# Patient Record
Sex: MALE | Race: WHITE | NOT HISPANIC OR LATINO | Employment: PART TIME | URBAN - METROPOLITAN AREA
[De-identification: names, ages, dates, MRNs, and addresses within clinical notes are randomized per-mention and may not be internally consistent; named-entity substitution may affect disease eponyms.]

---

## 2021-05-31 ENCOUNTER — RECORDS - HEALTHEAST (OUTPATIENT)
Dept: ADMINISTRATIVE | Facility: CLINIC | Age: 36
End: 2021-05-31

## 2022-02-18 ENCOUNTER — APPOINTMENT (OUTPATIENT)
Dept: RADIOLOGY | Facility: CLINIC | Age: 37
End: 2022-02-18
Attending: EMERGENCY MEDICINE

## 2022-02-18 ENCOUNTER — HOSPITAL ENCOUNTER (EMERGENCY)
Facility: CLINIC | Age: 37
Discharge: HOME | End: 2022-02-18
Attending: EMERGENCY MEDICINE | Admitting: EMERGENCY MEDICINE

## 2022-02-18 VITALS
SYSTOLIC BLOOD PRESSURE: 159 MMHG | BODY MASS INDEX: 27.09 KG/M2 | RESPIRATION RATE: 18 BRPM | OXYGEN SATURATION: 99 % | DIASTOLIC BLOOD PRESSURE: 96 MMHG | HEIGHT: 72 IN | HEART RATE: 89 BPM | TEMPERATURE: 97.5 F | WEIGHT: 200 LBS

## 2022-02-18 DIAGNOSIS — M25.572 ACUTE LEFT ANKLE PAIN: ICD-10-CM

## 2022-02-18 DIAGNOSIS — S93.602A FOOT SPRAIN, LEFT, INITIAL ENCOUNTER: ICD-10-CM

## 2022-02-18 PROCEDURE — 99284 EMERGENCY DEPT VISIT MOD MDM: CPT

## 2022-02-18 PROCEDURE — 73630 X-RAY EXAM OF FOOT: CPT | Mod: LT

## 2022-02-18 PROCEDURE — 73610 X-RAY EXAM OF ANKLE: CPT | Mod: LT

## 2022-02-18 ASSESSMENT — ENCOUNTER SYMPTOMS: ARTHRALGIAS: 1

## 2022-02-19 NOTE — ED PROVIDER NOTES
Emergency Department Encounter      NAME: Arcadio Adams  AGE: 36 year old male  YOB: 1985  MRN: [id number]  EVALUATION DATE & TIME: 2/18/2022 10:15 PM    PCP: No primary care provider on file.    ED PROVIDER: Gary Mitchell M.D.      Chief Complaint   Patient presents with     Foot Pain         FINAL IMPRESSION:  1. Foot sprain, left, initial encounter    2. Acute left ankle pain        MEDICAL DECISION MAKING:    10:34 PM I met with the patient, obtained history, performed an initial exam, and discussed options and plan for diagnostics and treatment here in the ED.   11:16 PM Rechecked and updated the patient on imaging results. We discussed the plan for discharge and the patient is agreeable. Reviewed supportive cares, symptomatic treatment, outpatient follow up, and reasons to return to the Emergency Department. Patient to be discharged by ED RN.     This patient is a 36-year-old male with a history of chronic pain and polysubstance abuse who presents with left ankle pain.  He says that he slipped and fell down a flight of stairs a week ago causing an inversion injury to his left ankle.  Since then he has been unable to bear weight on his left foot and ankle.  He says that he has pain over the forefoot where there is some swelling but no significant bruising or signs of cellulitis.  He additionally had some mild tenderness over his ankle which appeared otherwise normal.  X-rays are done of the ankle and foot which did not show any fracture or dislocation.  The patient was discharged home and encouraged to follow-up with his doctor or orthopedics if his symptoms continued.    Pertinent Labs & Imaging studies reviewed. (See chart for details)           The importance of close follow up was discussed. We reviewed warning signs and symptoms, and I instructed Mr. Adams to return to the emergency department immediately if he develops any new or worsening symptoms. I provided additional verbal  discharge instructions. Mr. Adams expressed understanding and agreement with this plan of care, his questions were answered, and he was discharged in stable condition.       MEDICATIONS GIVEN IN THE EMERGENCY:  Medications - No data to display    NEW PRESCRIPTIONS STARTED AT TODAY'S ER VISIT:  There are no discharge medications for this patient.         =================================================================    HPI    Patient information was obtained from: Patient    Use of : N/A       Arcadio Adams is a 36 year old male with a past medical history of chronic pain syndrome and polysubstance abuse, who presents to the ED for evaluation of left ankle pain.    The patient reports slipping and falling down a flight of stairs 1 week ago and twisting his left ankle. He had been unable to place weight on his left ankle until today, but repots severe pain when placing weight on his left foot. He has not had any imaging studies of his foot. He had noticed some purple discoloration to his toes, but today the discoloration was resolved. He has been elevating and icing his foot without much relief. He denies any pain to his left knee.    REVIEW OF SYSTEMS   Review of Systems   Constitutional: Negative for chills and fever.   HENT: Negative for congestion, ear pain, sneezing and sore throat.    Respiratory: Negative for cough and shortness of breath.    Cardiovascular: Negative for chest pain.   Gastrointestinal: Negative for diarrhea, nausea and vomiting.   Musculoskeletal: Positive for arthralgias (left ankle and foot).   All other systems reviewed and are negative.       PAST MEDICAL HISTORY:  History reviewed. No pertinent past medical history.    PAST SURGICAL HISTORY:  History reviewed. No pertinent surgical history.    CURRENT MEDICATIONS:    No current facility-administered medications for this encounter.  No current outpatient medications on file.    ALLERGIES:  Allergies   Allergen Reactions      Varenicline Other (See Comments)     suicidal       FAMILY HISTORY:  History reviewed. No pertinent family history.    SOCIAL HISTORY:   Social History     Socioeconomic History     Marital status:      Spouse name: None     Number of children: None     Years of education: None     Highest education level: None   Occupational History     None   Tobacco Use     Smoking status: None     Smokeless tobacco: None   Substance and Sexual Activity     Alcohol use: None     Drug use: None     Sexual activity: None   Other Topics Concern     None   Social History Narrative     None     Social Determinants of Health     Financial Resource Strain: Not on file   Food Insecurity: Not on file   Transportation Needs: Not on file   Physical Activity: Not on file   Stress: Not on file   Social Connections: Not on file   Intimate Partner Violence: Not on file   Housing Stability: Not on file       PHYSICAL EXAM:    Vitals: BP (!) 159/96   Pulse 89   Temp 97.5  F (36.4  C) (Oral)   Resp 18   Ht 1.829 m (6')   Wt 90.7 kg (200 lb)   SpO2 99%   BMI 27.12 kg/m     Constitutional: Well developed, well nourished. Comfortable appearing.  HENT: Normocephalic, atraumatic, mucous membranes moist, nose normal.   Neck- Supple, gross ROM intact.   Eyes: Pupils mid-range, sclera white, no discharge  Respiratory: Clear to auscultation bilaterally, no respiratory distress, no wheezing, speaks full sentences easily.  Cardiovascular: Normal heart rate, regular rhythm, no murmurs. No lower extremity edema, 2+ DP pulses.   GI: Soft, no tenderness to deep palpation in all quadrants, no masses.  Musculoskeletal: Left foot skin intact. Left Achilles tendon intact. No crepitus or deformity to the left foot or ankle. Left knee and proximal tibia and fibula nontender. Mild tenderness over the medial and lateral left ankle. Left forefoot is swollen and pink with tenderness over the distal 1st, 2nd, 3rd, and 4th metatarsals. Toes neurovascularly  intact. All other extremities normal, moving spontaneously without tenderness.  Skin: Warm, dry, no rash.  Neurologic: Alert & oriented x 3, speech clear, moving all extremities spontaneously   Psychiatric: Affect normal, cooperative.       RADIOLOGY:  XR Foot Left 3 Views   Final Result   IMPRESSION: Moderate dorsal soft tissue swelling, mid and forefoot. No fracture. Normal alignment.      XR Ankle Left 3 Views   Final Result   IMPRESSION: Normal joint spaces and alignment. No fracture.            I, Adolph Keron, am serving as a scribe to document services personally performed by Dr. Gary Mitchell based on my observation and the provider's statements to me. I, Gary Mitchell M.D. attest that Adolph Cabrales is acting in a scribe capacity, has observed my performance of the services and has documented them in accordance with my direction.      Gary Mitchell M.D.  Emergency Medicine  [facility] EMERGENCY ROOM  [address] [id number] [phone]  Dept: [phone]     Gary Mitchell MD  02/25/22 0137

## 2022-02-19 NOTE — ED TRIAGE NOTES
Pt fell down the stairs about a week ago and felt like he twisted his ankle at that time. Pt believes that he has broken his foot/ankle and reports noticing his toes were purple yesterday. L foot is swollen but CMS is intact, toes are pink currently

## 2022-02-24 ASSESSMENT — ENCOUNTER SYMPTOMS
FEVER: 0
CHILLS: 0
COUGH: 0
SHORTNESS OF BREATH: 0
VOMITING: 0
SORE THROAT: 0
NAUSEA: 0
DIARRHEA: 0

## 2025-07-22 ENCOUNTER — HOSPITAL ENCOUNTER (EMERGENCY)
Facility: CLINIC | Age: 40
Discharge: HOME | End: 2025-07-22
Attending: EMERGENCY MEDICINE

## 2025-07-22 VITALS
DIASTOLIC BLOOD PRESSURE: 84 MMHG | TEMPERATURE: 98.3 F | HEIGHT: 72 IN | HEART RATE: 85 BPM | OXYGEN SATURATION: 94 % | SYSTOLIC BLOOD PRESSURE: 143 MMHG | BODY MASS INDEX: 27.12 KG/M2 | RESPIRATION RATE: 18 BRPM

## 2025-07-22 DIAGNOSIS — T50.901A ACCIDENTAL DRUG INGESTION, INITIAL ENCOUNTER: ICD-10-CM

## 2025-07-22 LAB
ATRIAL RATE - MUSE: 73 BPM
DIASTOLIC BLOOD PRESSURE - MUSE: NORMAL MMHG
HOLD SPECIMEN: NORMAL
INTERPRETATION ECG - MUSE: NORMAL
P AXIS - MUSE: 8 DEGREES
PR INTERVAL - MUSE: 152 MS
QRS DURATION - MUSE: 88 MS
QT - MUSE: 380 MS
QTC - MUSE: 418 MS
R AXIS - MUSE: 61 DEGREES
SYSTOLIC BLOOD PRESSURE - MUSE: NORMAL MMHG
T AXIS - MUSE: 41 DEGREES
VENTRICULAR RATE- MUSE: 73 BPM

## 2025-07-22 PROCEDURE — 99285 EMERGENCY DEPT VISIT HI MDM: CPT | Performed by: EMERGENCY MEDICINE

## 2025-07-22 PROCEDURE — 93005 ELECTROCARDIOGRAM TRACING: CPT

## 2025-07-22 ASSESSMENT — COLUMBIA-SUICIDE SEVERITY RATING SCALE - C-SSRS
6. HAVE YOU EVER DONE ANYTHING, STARTED TO DO ANYTHING, OR PREPARED TO DO ANYTHING TO END YOUR LIFE?: NO
2. HAVE YOU ACTUALLY HAD ANY THOUGHTS OF KILLING YOURSELF IN THE PAST MONTH?: NO
1. IN THE PAST MONTH, HAVE YOU WISHED YOU WERE DEAD OR WISHED YOU COULD GO TO SLEEP AND NOT WAKE UP?: NO

## 2025-07-22 ASSESSMENT — ACTIVITIES OF DAILY LIVING (ADL)
ADLS_ACUITY_SCORE: 41

## 2025-07-22 NOTE — ED NOTES
RN called [facility] (dispatch) in regards to patient ready to be discharged. RN was notified an officer will be discharged shortly.

## 2025-07-22 NOTE — ED PROVIDER NOTES
Emergency Department Note      History of Present Illness     Chief Complaint   Drug Overdose    HPI   Arcadio Adams is a 40 year old male with history of substance abuse who presents to the ED with drug overdose. Patient reports he swallowed 2 grams of crystal methamphetamine 30 minutes prior to arrival. He states he also smoked fentanyl 2.5 hours ago. Just prior to arrival he was arrested for warrants. PD called EMS when he said he ingested meth. He states he was not trying to hurt himself, but just trying to get rid of the drugs. He uses meth about once a week and denies history of IV drug use. Per EMS he complained of left sided chest pain that radiates to his jaw. He is not currently taking any prescription medications.     Independent Historian   EMS as detailed above.    Review of External Notes   I reviewed Care Everywhere and updated Epic    Past Medical History     Medical History and Problem List   Past Medical History:   Diagnosis Date    Anxiety     Benzodiazepine abuse     Chronic pain     Depressive disorder     Methamphetamine abuse     Opioid abuse         Medications   No current outpatient medications on file.      Surgical History   Past Surgical History:   Procedure Laterality Date    Spinal fusion,  Anterior and posterior L4-S1 decompression and fusion   2008       Physical Exam     Patient Vitals for the past 24 hrs:   BP Temp Temp src Pulse Resp SpO2 Height   07/22/25 0541 157/81 -- -- 87 -- 96 % --   07/22/25 0531  144/83 -- -- 64 -- 97 % --   07/22/25 0514  142/84 -- -- 62 -- 95 % --   07/22/25 0510 -- -- -- 58 -- 97 % --   07/22/25 0459  150/85 -- -- 65 -- 96 % --   07/22/25 0456  150/88 -- -- 68 -- 97 % --   07/22/25 0455  150/88 -- -- 62 -- 96 % --   07/22/25 0440 -- -- -- 63 -- 95 % --   07/22/25 0430 -- -- -- 70 -- 96 % --   07/22/25 0425 -- -- -- 70 -- 96 % --   07/22/25 0415 -- -- -- 71 -- 96 % --   07/22/25 0410 -- -- -- 63 -- 95 % --   07/22/25 0400 -- -- -- 66 -- 96 % --    07/22/25 0355 -- -- -- 68 -- 95 % --   07/22/25 0345 -- -- -- 66 -- 94 % --   07/22/25 0340 -- -- -- 63 -- 96 % --   07/22/25 0329 -- -- -- 69 -- 95 % --   07/22/25 0325 -- -- -- 66 -- 95 % --   07/22/25 0319  145/89 -- -- 70 -- 96 % --   07/22/25 0315  144/86 98.3  F (36.8  C) Oral 80 18 96 % 1.829 m (6')     Physical Exam  Nursing note and vitals reviewed.  Constitutional: Cooperative. Disheveled and in restraints.   HENT:   Mouth/Throat: Mucous membranes are normal.   Cardiovascular: Normal rate, regular rhythm and normal heart sounds.  No murmur.  Pulmonary/Chest: Effort normal and breath sounds normal. No respiratory distress. No wheezes. No rales.   Abdominal: Soft.  Normal appearance.  Nontender  Musculoskeletal: Normal range of motion.   Neurological: Alert.  Oriented x 3  Skin: Skin is warm and dry    Diagnostics     Lab Results   Labs Ordered and Resulted from Time of ED Arrival to Time of ED Departure - No data to display    Imaging   None    EKG   ECG taken at 0303, ECG read at 0303  Normal sinus rhythm   Rate 73 bpm. TX interval 152 ms. QRS duration 88 ms. QT/QTc 380/418 ms. P-R-T axes 8 61 41.    ED Course      Medications Administered   Medications - No data to display    Discussion of Management   None    ED Course   ED Course as of 07/22/25 0603   Tue Jul 22, 2025   0302 I obtained history and examined the patient as noted above.    0511 I rechecked the patient.       Additional Documentation  None    Medical Decision Making / Diagnosis       JACKIE Adams is a 40 year old male who presents after stating he ingested methamphetamine.  No stigmata of methamphetamine intoxication.  Patient also endorsed fentanyl use.  No respiratory depression or other evidence of opioid toxicity.  He is observed in the emergency department for over 3 hours.  Vital signs remained stable within normal limits other than being slightly hypertensive.  At this time he is clear from a medical perspective.   Disposition will be with police.    Disposition   The patient was discharged.     Diagnosis     ICD-10-CM    1. Intentional drug, methamphetamine, ingestion, initial encounter  T50.901A              Scribe Disclosure:  I, Denny Bourgeois, am serving as a scribe at 5:12 AM on 7/22/2025 to document services personally performed by Ton Lyons MD based on my observations and the provider's statements to me.        Ton Lyons MD  07/22/25 0605

## 2025-07-22 NOTE — DISCHARGE INSTRUCTIONS
Ingesting drugs of abuse such as fentanyl or methamphetamine, or any drugs for that matter above the dosage that is prescribed can be very dangerous and we would highly suggest not doing this in the future as it can be life-threatening.

## 2025-07-22 NOTE — ED TRIAGE NOTES
Pt BIBA after being pulled over with friends, pt told EMS he ingested 2g crystal meth about 2 hrs ago and smoke fentanyl. Pt states he smokes fentanyl daily and takes meth 1x/wk. Pt states he is having L sided chest pain radiating to L side of neck. Blood sugar 120. PD brought in pt with EMS for outstanding warrants. A+Ox4.